# Patient Record
Sex: FEMALE | Race: OTHER | HISPANIC OR LATINO
[De-identification: names, ages, dates, MRNs, and addresses within clinical notes are randomized per-mention and may not be internally consistent; named-entity substitution may affect disease eponyms.]

---

## 2019-06-17 ENCOUNTER — APPOINTMENT (OUTPATIENT)
Dept: SURGERY | Facility: CLINIC | Age: 39
End: 2019-06-17
Payer: COMMERCIAL

## 2019-06-17 VITALS
HEIGHT: 62 IN | DIASTOLIC BLOOD PRESSURE: 85 MMHG | WEIGHT: 196 LBS | HEART RATE: 79 BPM | BODY MASS INDEX: 36.07 KG/M2 | SYSTOLIC BLOOD PRESSURE: 133 MMHG

## 2019-06-17 DIAGNOSIS — Z90.710 ACQUIRED ABSENCE OF BOTH CERVIX AND UTERUS: ICD-10-CM

## 2019-06-17 PROCEDURE — 99024 POSTOP FOLLOW-UP VISIT: CPT

## 2019-06-17 NOTE — HISTORY OF PRESENT ILLNESS
[de-identified] : 40 yo F s/p robotic hysterectomy for adenomyosis on 5/25/19 with Dr. Pérez who required repair of serosal injury of the sigmoid by me intraoperatively. She was hospitalized 2x postop for a small collection in her pelvis and ultimately got better with antibiotics and discharged. She was doing well, having virtually no pain with bowel movements but she did have lower back pain from sitting for prolonged periods of time and suprapubic pain, particularly after intercourse. She presented to the ED last week and underwent a CT showing some linear density between the rectum and vagina, likely adhesions.  She was seen by Dr. Pérez today who performed a vaginal exam but she as unable to tolerate a rectal exam. She denies any feculent or foul-smelling discharge from her vagina, she denies fevers or chills, she is able to move her bowels without pain.

## 2019-06-17 NOTE — PHYSICAL EXAM
[Respiratory Effort] : normal respiratory effort [Normal Rate and Rhythm] : normal rate and rhythm [No Rash or Lesion] : No rash or lesion [Alert] : alert [Oriented to Person] : oriented to person [Oriented to Place] : oriented to place [Oriented to Time] : oriented to time [Calm] : calm [de-identified] : Well-appearing, not in acute distress [de-identified] : soft, mild tenderness to deep palpation in the suprapubic area, no guarding, no distention [de-identified] : small external hemorrhoids, able to tolerate a rectal exam, no abnormality palpated

## 2019-06-17 NOTE — ASSESSMENT
[FreeTextEntry1] : 38 yo F s/p robotic hysterectomy and repair of sigmoid serosal injury. She is having pelvic pain, particularly from intercourse. My suspicion for a recto-vaginal fistula is low given her lack of symptoms suggesting any communication. This appears to be from scarring potentially. However, she is seeing Dr. Bg Grimes in consultation on 6/21. \par

## 2019-06-17 NOTE — CONSULT LETTER
[Dear  ___] : Dear  [unfilled], [Please see my note below.] : Please see my note below. [Consult Closing:] : Thank you very much for allowing me to participate in the care of this patient.  If you have any questions, please do not hesitate to contact me. [Sincerely,] : Sincerely, [FreeTextEntry1] : I saw MsKayley Farnsworth today in consultation.  [FreeTextEntry3] : Joellen Oglesby MD [DrKayley  ___] : Dr. ACUNA

## 2021-10-18 ENCOUNTER — APPOINTMENT (OUTPATIENT)
Dept: BARIATRICS/WEIGHT MGMT | Facility: CLINIC | Age: 41
End: 2021-10-18
Payer: MEDICAID

## 2021-10-18 VITALS — WEIGHT: 218.4 LBS | HEART RATE: 56 BPM | OXYGEN SATURATION: 98 % | BODY MASS INDEX: 40.19 KG/M2 | HEIGHT: 62 IN

## 2021-10-18 DIAGNOSIS — Z63.5 DISRUPTION OF FAMILY BY SEPARATION AND DIVORCE: ICD-10-CM

## 2021-10-18 DIAGNOSIS — Z86.59 PERSONAL HISTORY OF OTHER MENTAL AND BEHAVIORAL DISORDERS: ICD-10-CM

## 2021-10-18 DIAGNOSIS — R10.2 PELVIC AND PERINEAL PAIN: ICD-10-CM

## 2021-10-18 DIAGNOSIS — E73.9 LACTOSE INTOLERANCE, UNSPECIFIED: ICD-10-CM

## 2021-10-18 DIAGNOSIS — Z00.00 ENCOUNTER FOR GENERAL ADULT MEDICAL EXAMINATION W/OUT ABNORMAL FINDINGS: ICD-10-CM

## 2021-10-18 PROCEDURE — 99204 OFFICE O/P NEW MOD 45 MIN: CPT

## 2021-10-18 RX ORDER — ERGOCALCIFEROL 1.25 MG/1
1.25 MG CAPSULE, LIQUID FILLED ORAL
Qty: 4 | Refills: 0 | Status: ACTIVE | COMMUNITY
Start: 2021-10-05

## 2021-10-18 RX ORDER — ESTRADIOL 0.03 MG/D
0.03 PATCH, EXTENDED RELEASE TRANSDERMAL
Qty: 8 | Refills: 0 | Status: ACTIVE | COMMUNITY
Start: 2021-10-03

## 2021-10-18 SDOH — SOCIAL STABILITY - SOCIAL INSECURITY: DISRUPTION OF FAMILY BY SEPARATION AND DIVORCE: Z63.5

## 2021-10-18 NOTE — ASSESSMENT
[FreeTextEntry1] : Dietary Modification Education provided. Recommend a diet high in vegetables intake & lean protein. Recommend eating complex carbs instead of simple carbs. Discussed the healthy plate model and ways to help with portion control. \par Recommend increasing water intake to 8 glasses daily. \par Physical Activity- recommend aerobic exercise 3-4 times per week for 30-45 mins, recommend incorporating strength training 3 times per week. Pt's goal- walk 3-4 times per week for 20-30 mins. \par Labs- ordered fasting labs, including dex stress test\par Medication- pt meets criteria to initiate medication treatment of obesity. Instructed pt to reach out to insurance company to see if saxenda, wegovy, contrave or qsymia are covered. \par RTO in 2-4 weeks for lab review & possible medication Rx\par \par

## 2021-10-18 NOTE — HISTORY OF PRESENT ILLNESS
[FreeTextEntry1] : 42 y/o Female here for medical weight loss consultation\par Has tried low calorie diet, exercising and healthy diet in the past but often feels frustrated that she isn't able to lose weight\par Medical Hx: hypothyroidism, anxiety, back pain, lactose intolerance, Vit D deficiency\par Medications- levothyroxine, estradiol patch, VIt D supplementation\par Food Recall: B- Latte with skim milk, skipped meal, L- 2 slices of pizza, D- Shrimp, white rice and broccoli\par Exercise- denies formal exercising, feels that she struggles with lifting weight d/t back pain, enjoys walking for exercise. \par Water Intake- Inadequate\par Sleep- Inadequate d/t stress \par Stress Level - high r/t recent divorce & father passed away 2 months ago \par Mental Health- currently in therapy

## 2021-11-02 ENCOUNTER — NON-APPOINTMENT (OUTPATIENT)
Age: 41
End: 2021-11-02

## 2021-11-02 ENCOUNTER — APPOINTMENT (OUTPATIENT)
Dept: BARIATRICS/WEIGHT MGMT | Facility: CLINIC | Age: 41
End: 2021-11-02

## 2021-11-03 ENCOUNTER — TRANSCRIPTION ENCOUNTER (OUTPATIENT)
Age: 41
End: 2021-11-03

## 2021-11-04 ENCOUNTER — NON-APPOINTMENT (OUTPATIENT)
Age: 41
End: 2021-11-04

## 2021-11-16 ENCOUNTER — APPOINTMENT (OUTPATIENT)
Dept: BARIATRICS/WEIGHT MGMT | Facility: CLINIC | Age: 41
End: 2021-11-16
Payer: MEDICAID

## 2021-11-16 VITALS
SYSTOLIC BLOOD PRESSURE: 120 MMHG | BODY MASS INDEX: 39.75 KG/M2 | DIASTOLIC BLOOD PRESSURE: 80 MMHG | HEIGHT: 62 IN | RESPIRATION RATE: 16 BRPM | OXYGEN SATURATION: 98 % | TEMPERATURE: 98.1 F | HEART RATE: 73 BPM | WEIGHT: 216 LBS

## 2021-11-16 DIAGNOSIS — E78.5 HYPERLIPIDEMIA, UNSPECIFIED: ICD-10-CM

## 2021-11-16 PROCEDURE — 99214 OFFICE O/P EST MOD 30 MIN: CPT

## 2021-11-16 RX ORDER — DEXAMETHASONE 1 MG/1
1 TABLET ORAL
Qty: 1 | Refills: 0 | Status: DISCONTINUED | COMMUNITY
Start: 2021-10-18 | End: 2021-11-16

## 2021-11-16 RX ORDER — SEMAGLUTIDE 0.25 MG/.5ML
0.25 INJECTION, SOLUTION SUBCUTANEOUS
Qty: 1 | Refills: 0 | Status: DISCONTINUED | COMMUNITY
Start: 2021-11-04 | End: 2021-11-16

## 2021-11-16 NOTE — ASSESSMENT
[FreeTextEntry1] : Dietary Modification Education provided. Recommend a diet with vegetables, lean protein, and whole grains. Recommend avoiding processed foods, fried foods, cheese, added salt. \par Continue goal of daily water intake -8 glasses daily. \par Physical Activity- recommend aerobic exercise 3-4 times per week for 30-45 mins, recommend incorporating strength training 3 times per week. Pt's goal- walking 2 times per week and kickboxing 2 times per week\par Medication- pt meets criteria to initiate medication treatment of obesity. Does not have insurance coverage for obesity management medication, therapist advises against contrave at this time. \par Continue with dietary modification & increased exercise regimen. \par RD referral made for assistance with meal planning\par RTO in 1 month\par \par

## 2021-11-16 NOTE — REASON FOR VISIT
[Follow-Up Visit] : a follow-up visit for [Fatigue] : fatigue  [Obesity] : obesity [FreeTextEntry2] : hypothyroidism

## 2021-11-16 NOTE — HISTORY OF PRESENT ILLNESS
[FreeTextEntry1] : 40 y/o Female here for medical weight loss consultation\par Has tried low calorie diet, exercising and healthy diet in the past but often feels frustrated that she isn't able to lose weight\par Medical Hx: hypothyroidism, anxiety, back pain, lactose intolerance, Vit D deficiency\par Medications- levothyroxine, estradiol patch, VIt D supplementation\par Food Recall: B- Latte with skim milk, skipped meal, L- 2 slices of pizza, D- Shrimp, white rice and broccoli\par Exercise- denies formal exercising, feels that she struggles with lifting weight d/t back pain, enjoys walking for exercise. \par Water Intake- Inadequate\par Sleep- Inadequate d/t stress \par Stress Level - high r/t recent divorce & father passed away 2 months ago \par Mental Health- currently in therapy\par \par 11/16/21: Consents to having friend present during exam. Down 2 lb, focused on eating better, had skim milk and banana for breakfast & grilled chicken with vegetables for dinner.  Denies snacking throughout the day. Eats 3 meals daily, but admits that portion control is the main issue. Continues to see therapist for anxiety- reports that therapist does not agree with trying Wellbutrin at this time. Plans on going to a kickboxing class with her friend today- denies exercising otherwise. Requests RD referral to discuss specific dietary options for her specific medical hx- hypothyroidism, hyperlipidemia, hormonal treatment.

## 2021-12-06 ENCOUNTER — APPOINTMENT (OUTPATIENT)
Dept: BARIATRICS/WEIGHT MGMT | Facility: CLINIC | Age: 41
End: 2021-12-06

## 2021-12-08 VITALS — BODY MASS INDEX: 39.32 KG/M2 | WEIGHT: 215 LBS

## 2021-12-15 ENCOUNTER — APPOINTMENT (OUTPATIENT)
Dept: BARIATRICS/WEIGHT MGMT | Facility: CLINIC | Age: 41
End: 2021-12-15
Payer: MEDICAID

## 2021-12-15 DIAGNOSIS — E03.9 HYPOTHYROIDISM, UNSPECIFIED: ICD-10-CM

## 2021-12-15 DIAGNOSIS — E66.9 OBESITY, UNSPECIFIED: ICD-10-CM

## 2021-12-15 PROCEDURE — 97802 MEDICAL NUTRITION INDIV IN: CPT | Mod: 95

## 2022-02-02 ENCOUNTER — APPOINTMENT (OUTPATIENT)
Dept: BARIATRICS/WEIGHT MGMT | Facility: CLINIC | Age: 42
End: 2022-02-02

## 2024-06-11 ENCOUNTER — NON-APPOINTMENT (OUTPATIENT)
Age: 44
End: 2024-06-11

## 2024-06-14 ENCOUNTER — RESULT REVIEW (OUTPATIENT)
Age: 44
End: 2024-06-14

## 2024-06-14 ENCOUNTER — APPOINTMENT (OUTPATIENT)
Dept: HEMATOLOGY ONCOLOGY | Facility: CLINIC | Age: 44
End: 2024-06-14
Payer: COMMERCIAL

## 2024-06-14 VITALS
BODY MASS INDEX: 42.01 KG/M2 | TEMPERATURE: 97.2 F | HEIGHT: 62 IN | RESPIRATION RATE: 16 BRPM | SYSTOLIC BLOOD PRESSURE: 121 MMHG | HEART RATE: 73 BPM | WEIGHT: 228.31 LBS | OXYGEN SATURATION: 98 % | DIASTOLIC BLOOD PRESSURE: 79 MMHG

## 2024-06-14 DIAGNOSIS — Z87.828 PERSONAL HISTORY OF OTHER (HEALED) PHYSICAL INJURY AND TRAUMA: ICD-10-CM

## 2024-06-14 DIAGNOSIS — M89.9 DISORDER OF BONE, UNSPECIFIED: ICD-10-CM

## 2024-06-14 DIAGNOSIS — D75.1 SECONDARY POLYCYTHEMIA: ICD-10-CM

## 2024-06-14 PROCEDURE — 99205 OFFICE O/P NEW HI 60 MIN: CPT

## 2024-06-14 RX ORDER — LEVOTHYROXINE SODIUM 0.17 MG/1
175 TABLET ORAL
Refills: 0 | Status: COMPLETED | COMMUNITY
End: 2024-06-14

## 2024-06-14 RX ORDER — LEVOTHYROXINE SODIUM 137 UG/1
137 CAPSULE ORAL
Refills: 0 | Status: ACTIVE | COMMUNITY

## 2024-06-14 RX ORDER — METHYLPREDNISOLONE 4 MG/1
4 TABLET ORAL
Refills: 0 | Status: ACTIVE | COMMUNITY

## 2024-06-14 RX ORDER — ROSUVASTATIN CALCIUM 5 MG/1
TABLET, FILM COATED ORAL
Refills: 0 | Status: ACTIVE | COMMUNITY

## 2024-06-14 NOTE — HISTORY OF PRESENT ILLNESS
[de-identified] : Mrs.Cristina Farnsworth is a 44 year old female who presents to the office for a bone lesion.  MRI performed May 2024 shows: Mild gluteus tendinopathy, labial tear, marrow edema at symphysis pubis- clinical correlation advised.  Bone lesion right supra- acetabular region favor benign but increased in size and signal compared to previous.  Rec further eval and bone scan.  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

## 2024-06-14 NOTE — ASSESSMENT
[FreeTextEntry1] : 44-year-old female referred for evaluation of bone lesion. MRI performed May 2024 shows: Mild gluteus tendinopathy, labial tear, marrow edema at symphysis pubis- clinical correlation advised.  Bone lesion right supra- acetabular region favor benign but increased in size and signal compared to previous.  Rec further eval and bone scan.  - # MRI revealed lesion in supra- acetabular region. Plan: - labs - ifex, immunoglobulins, Ca 125 - bone scan  # Polycythemia - patient referred for evaluation of elevated Hg/ HCT - H/H 16/46 - labs send for epo, dre-2 with reflex, irons - BMI 41- if EPO normal- high will evaluate for OSAS. Patient noted by her  to snore at times.

## 2024-06-24 ENCOUNTER — RESULT REVIEW (OUTPATIENT)
Age: 44
End: 2024-06-24

## 2024-06-24 ENCOUNTER — APPOINTMENT (OUTPATIENT)
Dept: HEMATOLOGY ONCOLOGY | Facility: CLINIC | Age: 44
End: 2024-06-24

## 2024-06-24 VITALS
HEIGHT: 62 IN | TEMPERATURE: 97 F | DIASTOLIC BLOOD PRESSURE: 84 MMHG | OXYGEN SATURATION: 95 % | WEIGHT: 228 LBS | HEART RATE: 69 BPM | RESPIRATION RATE: 16 BRPM | BODY MASS INDEX: 41.96 KG/M2 | SYSTOLIC BLOOD PRESSURE: 126 MMHG

## 2024-06-25 ENCOUNTER — APPOINTMENT (OUTPATIENT)
Dept: PAIN MANAGEMENT | Facility: CLINIC | Age: 44
End: 2024-06-25
Payer: COMMERCIAL

## 2024-06-25 VITALS
SYSTOLIC BLOOD PRESSURE: 110 MMHG | HEIGHT: 62 IN | BODY MASS INDEX: 41.96 KG/M2 | WEIGHT: 228 LBS | DIASTOLIC BLOOD PRESSURE: 75 MMHG

## 2024-06-25 DIAGNOSIS — M79.18 MYALGIA, OTHER SITE: ICD-10-CM

## 2024-06-25 DIAGNOSIS — M54.16 RADICULOPATHY, LUMBAR REGION: ICD-10-CM

## 2024-06-25 DIAGNOSIS — M25.551 PAIN IN RIGHT HIP: ICD-10-CM

## 2024-06-25 DIAGNOSIS — M79.2 NEURALGIA AND NEURITIS, UNSPECIFIED: ICD-10-CM

## 2024-06-25 DIAGNOSIS — M25.552 PAIN IN RIGHT HIP: ICD-10-CM

## 2024-06-25 DIAGNOSIS — G89.4 CHRONIC PAIN SYNDROME: ICD-10-CM

## 2024-06-25 DIAGNOSIS — M47.817 SPONDYLOSIS W/OUT MYELOPATHY OR RADICULOPATHY, LUMBOSACRAL REGION: ICD-10-CM

## 2024-06-25 PROCEDURE — G2211 COMPLEX E/M VISIT ADD ON: CPT | Mod: NC,1L

## 2024-06-25 PROCEDURE — 99204 OFFICE O/P NEW MOD 45 MIN: CPT

## 2024-07-15 ENCOUNTER — RESULT REVIEW (OUTPATIENT)
Age: 44
End: 2024-07-15

## 2024-08-07 ENCOUNTER — APPOINTMENT (OUTPATIENT)
Dept: PAIN MANAGEMENT | Facility: CLINIC | Age: 44
End: 2024-08-07

## 2024-11-18 ENCOUNTER — APPOINTMENT (OUTPATIENT)
Dept: GASTROENTEROLOGY | Facility: CLINIC | Age: 44
End: 2024-11-18

## 2025-05-20 ENCOUNTER — TRANSCRIPTION ENCOUNTER (OUTPATIENT)
Age: 45
End: 2025-05-20

## 2025-05-22 ENCOUNTER — TRANSCRIPTION ENCOUNTER (OUTPATIENT)
Age: 45
End: 2025-05-22

## 2025-05-22 ENCOUNTER — RESULT REVIEW (OUTPATIENT)
Age: 45
End: 2025-05-22

## 2025-05-28 PROBLEM — F17.210 CIGARETTE SMOKER: Status: ACTIVE | Noted: 2025-05-28

## 2025-05-29 ENCOUNTER — APPOINTMENT (OUTPATIENT)
Dept: THORACIC SURGERY | Facility: CLINIC | Age: 45
End: 2025-05-29
Payer: COMMERCIAL

## 2025-05-29 DIAGNOSIS — F17.210 NICOTINE DEPENDENCE, CIGARETTES, UNCOMPLICATED: ICD-10-CM

## 2025-05-29 PROCEDURE — 99205 OFFICE O/P NEW HI 60 MIN: CPT

## 2025-06-06 ENCOUNTER — APPOINTMENT (OUTPATIENT)
Dept: PULMONOLOGY | Facility: CLINIC | Age: 45
End: 2025-06-06

## 2025-08-25 ENCOUNTER — APPOINTMENT (OUTPATIENT)
Dept: SURGERY | Facility: CLINIC | Age: 45
End: 2025-08-25
Payer: COMMERCIAL

## 2025-08-25 VITALS
TEMPERATURE: 97.9 F | DIASTOLIC BLOOD PRESSURE: 77 MMHG | HEART RATE: 90 BPM | WEIGHT: 231 LBS | BODY MASS INDEX: 45.35 KG/M2 | HEIGHT: 60 IN | SYSTOLIC BLOOD PRESSURE: 108 MMHG

## 2025-08-25 DIAGNOSIS — K43.6 OTHER AND UNSPECIFIED VENTRAL HERNIA WITH OBSTRUCTION, W/OUT GANGRENE: ICD-10-CM

## 2025-08-25 PROCEDURE — 99204 OFFICE O/P NEW MOD 45 MIN: CPT

## 2025-08-25 RX ORDER — TIRZEPATIDE 15 MG/.5ML
INJECTION, SOLUTION SUBCUTANEOUS
Refills: 0 | Status: ACTIVE | COMMUNITY

## 2025-09-19 ENCOUNTER — APPOINTMENT (OUTPATIENT)
Dept: PULMONOLOGY | Facility: CLINIC | Age: 45
End: 2025-09-19